# Patient Record
Sex: FEMALE | Race: BLACK OR AFRICAN AMERICAN | Employment: PART TIME | URBAN - METROPOLITAN AREA
[De-identification: names, ages, dates, MRNs, and addresses within clinical notes are randomized per-mention and may not be internally consistent; named-entity substitution may affect disease eponyms.]

---

## 2019-12-19 LAB
ANTIBODY SCREEN, EXTERNAL: NEGATIVE
CHLAMYDIA, EXTERNAL: NEGATIVE
HBSAG, EXTERNAL: NEGATIVE
HIV, EXTERNAL: NORMAL
N. GONORRHEA, EXTERNAL: NEGATIVE
RPR, EXTERNAL: NORMAL
RUBELLA, EXTERNAL: NORMAL
TYPE, ABO & RH, EXTERNAL: NORMAL

## 2020-04-11 LAB — GTT, 1 HR, GLUCOLA, EXTERNAL: 63

## 2020-06-29 LAB — GRBS, EXTERNAL: NEGATIVE

## 2020-07-15 ENCOUNTER — HOSPITAL ENCOUNTER (INPATIENT)
Age: 30
LOS: 2 days | Discharge: HOME | End: 2020-07-17
Attending: SPECIALIST | Admitting: SPECIALIST
Payer: COMMERCIAL

## 2020-07-15 ENCOUNTER — ANESTHESIA (OUTPATIENT)
Dept: LABOR AND DELIVERY | Age: 30
End: 2020-07-15
Payer: COMMERCIAL

## 2020-07-15 ENCOUNTER — ANESTHESIA EVENT (OUTPATIENT)
Dept: LABOR AND DELIVERY | Age: 30
End: 2020-07-15
Payer: COMMERCIAL

## 2020-07-15 DIAGNOSIS — Z98.891 S/P C-SECTION: Primary | ICD-10-CM

## 2020-07-15 LAB
BASOPHILS # BLD: 0 K/UL (ref 0–0.1)
BASOPHILS NFR BLD: 0 % (ref 0–1)
COVID-19 RAPID TEST, COVR: NOT DETECTED
DIFFERENTIAL METHOD BLD: ABNORMAL
EOSINOPHIL # BLD: 0.1 K/UL (ref 0–0.4)
EOSINOPHIL NFR BLD: 1 % (ref 0–7)
ERYTHROCYTE [DISTWIDTH] IN BLOOD BY AUTOMATED COUNT: 14.6 % (ref 11.5–14.5)
HCT VFR BLD AUTO: 31.8 % (ref 35–47)
HGB BLD-MCNC: 10.1 G/DL (ref 11.5–16)
IMM GRANULOCYTES # BLD AUTO: 0 K/UL (ref 0–0.04)
IMM GRANULOCYTES NFR BLD AUTO: 0 % (ref 0–0.5)
LYMPHOCYTES # BLD: 1.7 K/UL (ref 0.8–3.5)
LYMPHOCYTES NFR BLD: 23 % (ref 12–49)
MCH RBC QN AUTO: 26.9 PG (ref 26–34)
MCHC RBC AUTO-ENTMCNC: 31.8 G/DL (ref 30–36.5)
MCV RBC AUTO: 84.8 FL (ref 80–99)
MONOCYTES # BLD: 0.7 K/UL (ref 0–1)
MONOCYTES NFR BLD: 9 % (ref 5–13)
NEUTS SEG # BLD: 5 K/UL (ref 1.8–8)
NEUTS SEG NFR BLD: 67 % (ref 32–75)
NRBC # BLD: 0 K/UL (ref 0–0.01)
NRBC BLD-RTO: 0 PER 100 WBC
PLATELET # BLD AUTO: 259 K/UL (ref 150–400)
PMV BLD AUTO: 10.7 FL (ref 8.9–12.9)
RBC # BLD AUTO: 3.75 M/UL (ref 3.8–5.2)
SARS-COV-2, COV2: NOT DETECTED
SOURCE, COVRS: NORMAL
SPECIMEN SOURCE, FCOV2M: NORMAL
SPECIMEN SOURCE, FCOV2M: NORMAL
WBC # BLD AUTO: 7.4 K/UL (ref 3.6–11)

## 2020-07-15 PROCEDURE — 65270000029 HC RM PRIVATE

## 2020-07-15 PROCEDURE — 74011000250 HC RX REV CODE- 250: Performed by: ANESTHESIOLOGY

## 2020-07-15 PROCEDURE — 77030036554

## 2020-07-15 PROCEDURE — 36415 COLL VENOUS BLD VENIPUNCTURE: CPT

## 2020-07-15 PROCEDURE — 76060000078 HC EPIDURAL ANESTHESIA: Performed by: SPECIALIST

## 2020-07-15 PROCEDURE — 77030033542 HC RETRCTR RETNTS PANICLS GQSM -B

## 2020-07-15 PROCEDURE — 75410000003 HC RECOV DEL/VAG/CSECN EA 0.5 HR: Performed by: SPECIALIST

## 2020-07-15 PROCEDURE — 77030007866 HC KT SPN ANES BBMI -B: Performed by: ANESTHESIOLOGY

## 2020-07-15 PROCEDURE — 77030040179 HC DEV DRSG WND PICO S&N -C

## 2020-07-15 PROCEDURE — 85025 COMPLETE CBC W/AUTO DIFF WBC: CPT

## 2020-07-15 PROCEDURE — 87635 SARS-COV-2 COVID-19 AMP PRB: CPT

## 2020-07-15 PROCEDURE — 77030008459 HC STPLR SKN COOP -B

## 2020-07-15 PROCEDURE — 86900 BLOOD TYPING SEROLOGIC ABO: CPT

## 2020-07-15 PROCEDURE — 76010000391 HC C SECN FIRST 1 HR: Performed by: SPECIALIST

## 2020-07-15 PROCEDURE — 77030040361 HC SLV COMPR DVT MDII -B

## 2020-07-15 PROCEDURE — 74011250636 HC RX REV CODE- 250/636: Performed by: ANESTHESIOLOGY

## 2020-07-15 PROCEDURE — 74011250636 HC RX REV CODE- 250/636: Performed by: SPECIALIST

## 2020-07-15 PROCEDURE — 74011000258 HC RX REV CODE- 258: Performed by: ANESTHESIOLOGY

## 2020-07-15 PROCEDURE — 74011000250 HC RX REV CODE- 250: Performed by: SPECIALIST

## 2020-07-15 PROCEDURE — 77030018836 HC SOL IRR NACL ICUM -A

## 2020-07-15 RX ORDER — SODIUM CHLORIDE 0.9 % (FLUSH) 0.9 %
5-40 SYRINGE (ML) INJECTION AS NEEDED
Status: DISCONTINUED | OUTPATIENT
Start: 2020-07-15 | End: 2020-07-17 | Stop reason: HOSPADM

## 2020-07-15 RX ORDER — BUPIVACAINE HYDROCHLORIDE 7.5 MG/ML
INJECTION, SOLUTION EPIDURAL; RETROBULBAR AS NEEDED
Status: DISCONTINUED | OUTPATIENT
Start: 2020-07-15 | End: 2020-07-15 | Stop reason: HOSPADM

## 2020-07-15 RX ORDER — FAMOTIDINE 10 MG/ML
INJECTION INTRAVENOUS AS NEEDED
Status: DISCONTINUED | OUTPATIENT
Start: 2020-07-15 | End: 2020-07-15 | Stop reason: HOSPADM

## 2020-07-15 RX ORDER — SODIUM CHLORIDE, SODIUM LACTATE, POTASSIUM CHLORIDE, CALCIUM CHLORIDE 600; 310; 30; 20 MG/100ML; MG/100ML; MG/100ML; MG/100ML
150 INJECTION, SOLUTION INTRAVENOUS CONTINUOUS
Status: DISCONTINUED | OUTPATIENT
Start: 2020-07-15 | End: 2020-07-17 | Stop reason: HOSPADM

## 2020-07-15 RX ORDER — NALOXONE HYDROCHLORIDE 0.4 MG/ML
0.4 INJECTION, SOLUTION INTRAMUSCULAR; INTRAVENOUS; SUBCUTANEOUS AS NEEDED
Status: DISCONTINUED | OUTPATIENT
Start: 2020-07-15 | End: 2020-07-17 | Stop reason: HOSPADM

## 2020-07-15 RX ORDER — IBUPROFEN 800 MG/1
800 TABLET ORAL EVERY 8 HOURS
Status: DISCONTINUED | OUTPATIENT
Start: 2020-07-15 | End: 2020-07-17 | Stop reason: HOSPADM

## 2020-07-15 RX ORDER — SODIUM CHLORIDE 0.9 % (FLUSH) 0.9 %
5-40 SYRINGE (ML) INJECTION EVERY 8 HOURS
Status: DISCONTINUED | OUTPATIENT
Start: 2020-07-15 | End: 2020-07-15 | Stop reason: HOSPADM

## 2020-07-15 RX ORDER — FENTANYL CITRATE 50 UG/ML
INJECTION, SOLUTION INTRAMUSCULAR; INTRAVENOUS AS NEEDED
Status: DISCONTINUED | OUTPATIENT
Start: 2020-07-15 | End: 2020-07-15 | Stop reason: HOSPADM

## 2020-07-15 RX ORDER — SODIUM CHLORIDE, SODIUM LACTATE, POTASSIUM CHLORIDE, CALCIUM CHLORIDE 600; 310; 30; 20 MG/100ML; MG/100ML; MG/100ML; MG/100ML
1000 INJECTION, SOLUTION INTRAVENOUS CONTINUOUS
Status: DISCONTINUED | OUTPATIENT
Start: 2020-07-15 | End: 2020-07-15 | Stop reason: HOSPADM

## 2020-07-15 RX ORDER — ONDANSETRON 2 MG/ML
INJECTION INTRAMUSCULAR; INTRAVENOUS AS NEEDED
Status: DISCONTINUED | OUTPATIENT
Start: 2020-07-15 | End: 2020-07-15 | Stop reason: HOSPADM

## 2020-07-15 RX ORDER — KETOROLAC TROMETHAMINE 30 MG/ML
30 INJECTION, SOLUTION INTRAMUSCULAR; INTRAVENOUS
Status: ACTIVE | OUTPATIENT
Start: 2020-07-15 | End: 2020-07-16

## 2020-07-15 RX ORDER — SIMETHICONE 80 MG
80 TABLET,CHEWABLE ORAL AS NEEDED
Status: DISCONTINUED | OUTPATIENT
Start: 2020-07-15 | End: 2020-07-17 | Stop reason: HOSPADM

## 2020-07-15 RX ORDER — HYDROCHLOROTHIAZIDE 25 MG/1
25 TABLET ORAL 2 TIMES DAILY
Status: DISCONTINUED | OUTPATIENT
Start: 2020-07-15 | End: 2020-07-17 | Stop reason: HOSPADM

## 2020-07-15 RX ORDER — NALOXONE HYDROCHLORIDE 0.4 MG/ML
0.1 INJECTION, SOLUTION INTRAMUSCULAR; INTRAVENOUS; SUBCUTANEOUS
Status: DISCONTINUED | OUTPATIENT
Start: 2020-07-15 | End: 2020-07-17 | Stop reason: HOSPADM

## 2020-07-15 RX ORDER — MORPHINE SULFATE 0.5 MG/ML
INJECTION, SOLUTION EPIDURAL; INTRATHECAL; INTRAVENOUS AS NEEDED
Status: DISCONTINUED | OUTPATIENT
Start: 2020-07-15 | End: 2020-07-15 | Stop reason: HOSPADM

## 2020-07-15 RX ORDER — OXYCODONE HYDROCHLORIDE 5 MG/1
10 TABLET ORAL
Status: ACTIVE | OUTPATIENT
Start: 2020-07-15 | End: 2020-07-16

## 2020-07-15 RX ORDER — ONDANSETRON 2 MG/ML
4 INJECTION INTRAMUSCULAR; INTRAVENOUS
Status: DISCONTINUED | OUTPATIENT
Start: 2020-07-15 | End: 2020-07-17 | Stop reason: HOSPADM

## 2020-07-15 RX ORDER — ACETAMINOPHEN 325 MG/1
650 TABLET ORAL
Status: DISCONTINUED | OUTPATIENT
Start: 2020-07-15 | End: 2020-07-17 | Stop reason: HOSPADM

## 2020-07-15 RX ORDER — DIPHENHYDRAMINE HYDROCHLORIDE 50 MG/ML
12.5 INJECTION, SOLUTION INTRAMUSCULAR; INTRAVENOUS
Status: DISCONTINUED | OUTPATIENT
Start: 2020-07-15 | End: 2020-07-15 | Stop reason: SDUPTHER

## 2020-07-15 RX ORDER — OXYCODONE HYDROCHLORIDE 5 MG/1
5 TABLET ORAL
Status: DISCONTINUED | OUTPATIENT
Start: 2020-07-15 | End: 2020-07-15

## 2020-07-15 RX ORDER — SODIUM CHLORIDE 0.9 % (FLUSH) 0.9 %
5-40 SYRINGE (ML) INJECTION AS NEEDED
Status: DISCONTINUED | OUTPATIENT
Start: 2020-07-15 | End: 2020-07-15 | Stop reason: HOSPADM

## 2020-07-15 RX ORDER — OXYTOCIN 10 [USP'U]/ML
INJECTION, SOLUTION INTRAMUSCULAR; INTRAVENOUS AS NEEDED
Status: DISCONTINUED | OUTPATIENT
Start: 2020-07-15 | End: 2020-07-15 | Stop reason: HOSPADM

## 2020-07-15 RX ORDER — SODIUM CHLORIDE 0.9 % (FLUSH) 0.9 %
5-40 SYRINGE (ML) INJECTION EVERY 8 HOURS
Status: DISCONTINUED | OUTPATIENT
Start: 2020-07-15 | End: 2020-07-16

## 2020-07-15 RX ORDER — DIPHENHYDRAMINE HYDROCHLORIDE 50 MG/ML
12.5 INJECTION, SOLUTION INTRAMUSCULAR; INTRAVENOUS
Status: DISCONTINUED | OUTPATIENT
Start: 2020-07-15 | End: 2020-07-17 | Stop reason: HOSPADM

## 2020-07-15 RX ORDER — MISOPROSTOL 100 UG/1
TABLET ORAL AS NEEDED
Status: DISCONTINUED | OUTPATIENT
Start: 2020-07-15 | End: 2020-07-15 | Stop reason: HOSPADM

## 2020-07-15 RX ORDER — OXYCODONE AND ACETAMINOPHEN 5; 325 MG/1; MG/1
1 TABLET ORAL
Status: DISCONTINUED | OUTPATIENT
Start: 2020-07-15 | End: 2020-07-17 | Stop reason: HOSPADM

## 2020-07-15 RX ORDER — EPHEDRINE SULFATE/0.9% NACL/PF 50 MG/5 ML
SYRINGE (ML) INTRAVENOUS AS NEEDED
Status: DISCONTINUED | OUTPATIENT
Start: 2020-07-15 | End: 2020-07-15 | Stop reason: HOSPADM

## 2020-07-15 RX ORDER — ZOLPIDEM TARTRATE 5 MG/1
5 TABLET ORAL
Status: DISCONTINUED | OUTPATIENT
Start: 2020-07-15 | End: 2020-07-17 | Stop reason: HOSPADM

## 2020-07-15 RX ADMIN — PHENYLEPHRINE HYDROCHLORIDE 50 MCG: 10 INJECTION INTRAVENOUS at 13:54

## 2020-07-15 RX ADMIN — FAMOTIDINE 20 MG: 10 INJECTION, SOLUTION INTRAVENOUS at 13:46

## 2020-07-15 RX ADMIN — SODIUM CHLORIDE, SODIUM LACTATE, POTASSIUM CHLORIDE, AND CALCIUM CHLORIDE 1000 ML: 600; 310; 30; 20 INJECTION, SOLUTION INTRAVENOUS at 12:22

## 2020-07-15 RX ADMIN — OXYTOCIN 40 UNITS: 10 INJECTION, SOLUTION INTRAMUSCULAR; INTRAVENOUS at 14:07

## 2020-07-15 RX ADMIN — WATER 2 G: 1 INJECTION INTRAMUSCULAR; INTRAVENOUS; SUBCUTANEOUS at 13:41

## 2020-07-15 RX ADMIN — FENTANYL CITRATE 10 MCG: 0.05 INJECTION, SOLUTION INTRAMUSCULAR; INTRAVENOUS at 13:49

## 2020-07-15 RX ADMIN — PHENYLEPHRINE HYDROCHLORIDE 50 MCG: 10 INJECTION INTRAVENOUS at 13:56

## 2020-07-15 RX ADMIN — ONDANSETRON HYDROCHLORIDE 4 MG: 2 SOLUTION INTRAMUSCULAR; INTRAVENOUS at 13:44

## 2020-07-15 RX ADMIN — SODIUM CHLORIDE, SODIUM LACTATE, POTASSIUM CHLORIDE, AND CALCIUM CHLORIDE 150 ML/HR: 600; 310; 30; 20 INJECTION, SOLUTION INTRAVENOUS at 23:25

## 2020-07-15 RX ADMIN — MORPHINE SULFATE 200 MCG: 0.5 INJECTION, SOLUTION EPIDURAL; INTRATHECAL; INTRAVENOUS at 13:49

## 2020-07-15 RX ADMIN — Medication 10 MG: at 14:03

## 2020-07-15 RX ADMIN — SODIUM CHLORIDE, SODIUM LACTATE, POTASSIUM CHLORIDE, AND CALCIUM CHLORIDE: 600; 310; 30; 20 INJECTION, SOLUTION INTRAVENOUS at 14:02

## 2020-07-15 RX ADMIN — BUPIVACAINE HYDROCHLORIDE 1.6 ML: 7.5 INJECTION, SOLUTION EPIDURAL; RETROBULBAR at 13:49

## 2020-07-15 RX ADMIN — ONDANSETRON 4 MG: 2 INJECTION INTRAMUSCULAR; INTRAVENOUS at 18:43

## 2020-07-15 RX ADMIN — SODIUM CHLORIDE, SODIUM LACTATE, POTASSIUM CHLORIDE, AND CALCIUM CHLORIDE 1000 ML: 600; 310; 30; 20 INJECTION, SOLUTION INTRAVENOUS at 11:00

## 2020-07-15 RX ADMIN — Medication 10 MG: at 13:51

## 2020-07-15 RX ADMIN — Medication 10 MG: at 13:58

## 2020-07-15 NOTE — LACTATION NOTE
This note was copied from a baby's chart. Discussed with mother her plan for feeding. Reviewed the benefits of exclusive breast milk feeding during the hospital stay. Informed her of the risks of using formula to supplement in the first few days of life as well as the benefits of successful breast milk feeding; referred her to the Breastfeeding booklet about this information. She acknowledges understanding of information reviewed and states that it is her plan to breastfeed her infant. Will support her choice and offer additional information as needed. Reviewed breastfeeding basics:  How milk is made and normal  breastfeeding behaviors discussed. Supply and demand,  stomach size, early feeding cues, skin to skin bonding with comfortable positioning and baby led latch-on reviewed. How to identify signs of successful breastfeeding sessions reviewed; education on assymetrical latch, signs of effective latching vs shallow, in-effective latching, normal  feeding frequency and duration and expected infant output discussed. Normal course of breastfeeding discussed including the AAP's recommendation that children receive exclusive breast milk feedings for the first six months of life with breast milk feedings to continue through the first year of life and/or beyond as complimentary table foods are added. Breastfeeding Booklet and Warm line information provided with discussion. Discussed typical  weight loss and the importance of pediatrician appointment within 24-48 hours of discharge, at 2 weeks of life and normalcy of requesting pediatric weight checks as needed in between visits. Pt will successfully establish breastfeeding by feeding in response to early feeding cues   or wake every 3h, will obtain deep latch, and will keep log of feedings/output. Taught to BF at hunger cues and or q 2-3 hrs and to offer 10-20 drops of hand expressed colostrum at any non-feeds.       Breast Assessment  Left Breast: Large  Left Nipple: Everted, Intact  Right Breast: Large  Right Nipple: Everted, Intact  Breast- Feeding Assessment  Attends Breast-Feeding Classes: No  Breast-Feeding Experience: Yes  Type/Quality: Good  Lactation Consultant Visits  Breast-Feedings: Good   Mother/Infant Observation  Mother Observation: Alignment, Breast comfortable, Holds breast, Lets baby end feeding  Infant Observation: Lips flanged, lower, Latches nipple and aereolae, Rhythmic suck, Relaxed after feeding, Opens mouth  LATCH Documentation  Latch: Repeated attempts, hold nipple in mouth, stimulate to suck  Audible Swallowing: A few with stimulation  Type of Nipple: Everted (after stimulation)  Comfort (Breast/Nipple): Soft/non-tender  Hold (Positioning): Full assist, teach one side, mother does other, staff holds  [facility] Score: 7

## 2020-07-15 NOTE — ANESTHESIA POSTPROCEDURE EVALUATION
Procedure(s):   SECTION. spinal    Anesthesia Post Evaluation      Multimodal analgesia: multimodal analgesia used between 6 hours prior to anesthesia start to PACU discharge  Patient location during evaluation: PACU  Patient participation: complete - patient participated  Level of consciousness: awake and alert  Airway patency: patent  Anesthetic complications: no  Cardiovascular status: acceptable  Respiratory status: acceptable  Hydration status: acceptable        INITIAL Post-op Vital signs:   Vitals Value Taken Time   /56 7/15/2020  2:40 PM   Temp     Pulse 78 7/15/2020  2:40 PM   Resp 17 7/15/2020  2:40 PM   SpO2 100 % 7/15/2020  2:48 PM   Vitals shown include unvalidated device data.

## 2020-07-15 NOTE — PROGRESS NOTES
7/15/2020  12:43 PM    CM met with REYNA to complete initial assessment and complete discharge planning. MOB verified and confirmed demographics. MOB lives with spouse/FOB- Filiberto Alicia along with their 6yr old, at the address on file. MOB is employed with [employer], and plans to take 12wks off from work. FOB is also employed and will be taking adequate time off. REYNA reports she has good family support. REYNA plans to breast feed baby and has pump to use at home. MOB plans to follow with [facility]. MOB has car seat, bassinet/crib, clothing, bottles and all necessary supplies for baby. MOB has [employer], but noted that baby would be added to [employer]. CM discussed process to add baby to insurance, MOB verbalized understanding. MOB denied [address] services. Care Management Interventions  PCP Verified by CM: Yes(Dr. Luis A Clark)  Mode of Transport at Discharge:  Other (see comment)  Transition of Care Consult (CM Consult): Discharge Planning  Current Support Network: Lives with Spouse, Family Lives [place], Own Home  Confirm Follow Up Transport: Family  Discharge Location  Discharge Placement: Home with family assistance  Danna Curtis

## 2020-07-15 NOTE — PROGRESS NOTES
Covid PCR still pending, RN called lab to check status (rapid was negative), results should be back in the morning per .

## 2020-07-15 NOTE — PROGRESS NOTES
Bedside and Verbal shift change report given to JOLIE Obrien RN (oncoming nurse) by Oni Feliciano RN (offgoing nurse). Report included the following information SBAR, Kardex, Intake/Output, MAR and Recent Results.

## 2020-07-15 NOTE — ANESTHESIA PREPROCEDURE EVALUATION
Anesthetic History   No history of anesthetic complications            Review of Systems / Medical History  Patient summary reviewed, nursing notes reviewed and pertinent labs reviewed    Pulmonary  Within defined limits                 Neuro/Psych   Within defined limits           Cardiovascular    Hypertension              Exercise tolerance: >4 METS     GI/Hepatic/Renal  Within defined limits              Endo/Other  Within defined limits           Other Findings              Physical Exam    Airway  Mallampati: II  TM Distance: 4 - 6 cm  Neck ROM: normal range of motion   Mouth opening: Normal     Cardiovascular               Dental  No notable dental hx       Pulmonary                 Abdominal         Other Findings            Anesthetic Plan    ASA: 2  Anesthesia type: spinal            Anesthetic plan and risks discussed with: Patient

## 2020-07-15 NOTE — H&P
History & Physical    Name: Kary Cuenca MRN: [id number]  SSN: [id number]    YOB: 1990  Age: 26 y.o. Sex: female      Subjective:     Estimated Date of Delivery: None noted. OB History    Para Term  AB Living   2 1 1 0 0 1   SAB TAB Ectopic Molar Multiple Live Births   0 0 0 0   1      # Outcome Date GA Lbr Jb/2nd Weight Sex Delivery Anes PTL Lv   2 Current            1 Term 14 39w3d  2.92 kg F  EPIDURAL AN, SPINAL AN N JING       Ms. Mike Chase admitted with pregnancy at Unknown for  section due to previous  section. Prenatal course was normal. Please see prenatal records for details. Past Medical History:   Diagnosis Date    ADHD     was on Adderall    Essential hypertension     in childhood, no meds ever needed     Past Surgical History:   Procedure Laterality Date    HX  SECTION      HX COLPOSCOPY  2018    normal     Social History     Occupational History    Occupation: OT    Tobacco Use    Smoking status: Never Smoker    Smokeless tobacco: Never Used   Substance and Sexual Activity    Alcohol use:  Yes     Alcohol/week: 2.0 standard drinks     Types: 2 Glasses of wine per week    Drug use: Yes     Types: Marijuana     Comment: very rare    Sexual activity: Yes     Partners: Male     Birth control/protection: Condom     Family History   Problem Relation Age of Onset    Hypertension Mother     High Cholesterol Mother     Hypertension Father     High Cholesterol Father     Prostate Cancer Father     No Known Problems Brother     Dementia Maternal Grandmother     Hypertension Maternal Grandmother     Hypertension Paternal Grandmother     Hypertension Paternal Grandfather     Diabetes Paternal Uncle     Schizophrenia Paternal Uncle     Diabetes Paternal Aunt        Allergies   Allergen Reactions    Bactrim [Sulfamethoprim Ds] Hives    Bactrim [Sulfamethoprim] Hives     Prior to Admission medications    Medication Sig Start Date End Date Taking? Authorizing Provider   dextroamphetamine-amphetamine (ADDERALL) 10 mg tablet Take 1 Tab by mouth two (2) times a day. Max Daily Amount: 20 mg. 20   Naty Pedroza PA   ibuprofen (MOTRIN) 800 mg tablet Take 1 Tab by mouth every eight (8) hours as needed for Pain. 14   Harry Samano MD        Review of Systems: A comprehensive review of systems was negative except for that written in the History of Present Illness. Objective:     Vitals:  Vitals:    07/15/20 1111   Height: 5' (1.524 m)        Physical Exam:  Deferred  Membranes:  Intact  Fetal Heart Rate: Reactive    Prenatal Labs:   Lab Results   Component Value Date/Time    Rubella, External Immune 2013    GrBStrep, External Pos.  04/15/2014    HBsAg, External Neg 2013    HIV, External NR 2013    RPR, External NR 2013    Gonorrhea, External neg 2013    Chlamydia, External neg 2013    ABO,Rh O Pos.  2013         Impression/Plan:     Plan:  Admit for  section. Group B Strep was see prenatal. Discussed the risks of surgery including the risks of bleeding, infection, deep vein thrombosis, and surgical injuries to internal organs including but not limited to the bowels, bladder, rectum, and female reproductive organs. The patient understands the risks; any and all questions were answered to the patient's satisfaction.

## 2020-07-15 NOTE — PROGRESS NOTES
1032 Patient that is a  arrived for scheduled repeat . 1043 EFM connected by this RN.     1100 Consents signed at this time. 1115 Patient states she is feeling light headed, RN assisted patient left lateral.     1135 Dr. Pastor Feliz called to ESTELA slo RN. MD states that he is running behind and will be here around 1300.     1147 Lab called this RN to update that patients rapid covid test is negative. 1230 ESTELA sol RN called this RN to updated that Dr. Pastor Feliz states that he will arrive between [id number].    1245 Dr. Brody Cabrera anesthesiologist bedside consenting the patient to surgery. 80 Dr. Pastor Feliz bedside consenting patient to surgery. 1344 Patient ambulatory to OR 2.     1347 Time out performed by this RN before placement of spinal in OR.     1353 Fetal Heart tones in OR noted to be 115.    1402 Time out performed by this RN in the OR before incision. Incision made by MD at this time. 1406 Vigorously crying female infant born via repeat LTCS. 1417 Incision closed and MD out of the OR.     1430 Recovery period initiated. 1436 Patient transferred from OR 2 back to room 204.     1500 Delivery QBL of 660ml. Patient sitting up tolerating ice chips at this time. 1600 Bedside and Verbal shift change report given to [facility] (oncoming nurse) by Robert Norris RN (offgoing nurse). Report included the following information Kardex, OR Summary, Intake/Output, MAR and Recent Results.

## 2020-07-15 NOTE — ANESTHESIA PROCEDURE NOTES
Spinal Block    Start time: 7/15/2020 1:48 PM  End time: 7/15/2020 1:49 PM  Performed by: Norma Dia MD  Authorized by: Norma Dia MD     Pre-procedure:   Indications: at surgeon's request and primary anesthetic  Preanesthetic Checklist: patient identified, risks and benefits discussed, anesthesia consent and timeout performed      Spinal Block:   Patient Position:  Seated  Prep Region:  Lumbar  Prep: chlorhexidine      Location:  L3-4  Technique:  Single shot        Needle:   Needle Type:  Pencan  Needle Gauge:  25 G  Attempts:  1      Events: CSF confirmed, no blood with aspiration and no paresthesia        Assessment:  Insertion:  Uncomplicated  Patient tolerance:  Patient tolerated the procedure well with no immediate complications

## 2020-07-15 NOTE — PROGRESS NOTES
1550 Received report from Glory Fontanez RN Patient resting on the stretcher. 1710 Patient transferred to MIU. Pepito Thomason RN assumed care of patient.

## 2020-07-16 LAB
ABO + RH BLD: NORMAL
BASOPHILS # BLD: 0 K/UL (ref 0–0.1)
BASOPHILS NFR BLD: 0 % (ref 0–1)
BLOOD GROUP ANTIBODIES SERPL: NORMAL
DIFFERENTIAL METHOD BLD: ABNORMAL
EOSINOPHIL # BLD: 0 K/UL (ref 0–0.4)
EOSINOPHIL NFR BLD: 0 % (ref 0–7)
ERYTHROCYTE [DISTWIDTH] IN BLOOD BY AUTOMATED COUNT: 14.4 % (ref 11.5–14.5)
HCT VFR BLD AUTO: 29 % (ref 35–47)
HGB BLD-MCNC: 9.4 G/DL (ref 11.5–16)
IMM GRANULOCYTES # BLD AUTO: 0.1 K/UL (ref 0–0.04)
IMM GRANULOCYTES NFR BLD AUTO: 1 % (ref 0–0.5)
LYMPHOCYTES # BLD: 1.1 K/UL (ref 0.8–3.5)
LYMPHOCYTES NFR BLD: 9 % (ref 12–49)
MCH RBC QN AUTO: 27.6 PG (ref 26–34)
MCHC RBC AUTO-ENTMCNC: 32.4 G/DL (ref 30–36.5)
MCV RBC AUTO: 85 FL (ref 80–99)
MONOCYTES # BLD: 1.1 K/UL (ref 0–1)
MONOCYTES NFR BLD: 8 % (ref 5–13)
NEUTS SEG # BLD: 10.6 K/UL (ref 1.8–8)
NEUTS SEG NFR BLD: 82 % (ref 32–75)
NRBC # BLD: 0 K/UL (ref 0–0.01)
NRBC BLD-RTO: 0 PER 100 WBC
PLATELET # BLD AUTO: 222 K/UL (ref 150–400)
PMV BLD AUTO: 10.7 FL (ref 8.9–12.9)
RBC # BLD AUTO: 3.41 M/UL (ref 3.8–5.2)
SPECIMEN EXP DATE BLD: NORMAL
WBC # BLD AUTO: 12.8 K/UL (ref 3.6–11)

## 2020-07-16 PROCEDURE — 65270000029 HC RM PRIVATE

## 2020-07-16 PROCEDURE — 85025 COMPLETE CBC W/AUTO DIFF WBC: CPT

## 2020-07-16 PROCEDURE — 74011250636 HC RX REV CODE- 250/636: Performed by: SPECIALIST

## 2020-07-16 PROCEDURE — 36415 COLL VENOUS BLD VENIPUNCTURE: CPT

## 2020-07-16 PROCEDURE — 74011250637 HC RX REV CODE- 250/637: Performed by: SPECIALIST

## 2020-07-16 PROCEDURE — 77030018846 HC SOL IRR STRL H20 ICUM -A

## 2020-07-16 RX ADMIN — SODIUM CHLORIDE, SODIUM LACTATE, POTASSIUM CHLORIDE, AND CALCIUM CHLORIDE 150 ML/HR: 600; 310; 30; 20 INJECTION, SOLUTION INTRAVENOUS at 06:36

## 2020-07-16 RX ADMIN — SIMETHICONE 80 MG: 80 TABLET, CHEWABLE ORAL at 17:18

## 2020-07-16 RX ADMIN — IBUPROFEN 800 MG: 800 TABLET ORAL at 17:18

## 2020-07-16 RX ADMIN — HYDROCHLOROTHIAZIDE 25 MG: 25 TABLET ORAL at 17:18

## 2020-07-16 RX ADMIN — MUPIROCIN: 20 OINTMENT TOPICAL at 11:01

## 2020-07-16 NOTE — OP NOTES
[facility] 77  OPERATIVE REPORT    Name:  Darcie Snowden  MR#:  [id number]  :  1990  ACCOUNT #:  [de-identified]  DATE OF SERVICE:  07/15/2020    PREOPERATIVE DIAGNOSIS:  Term pregnancy, previous . POSTOPERATIVE DIAGNOSIS:  Term pregnancy, previous . PROCEDURE PERFORMED:  Repeat low transverse  section. SURGEON:  Estela Salinas MD    ASSISTANT:  Nursing. ANESTHESIA:  Spinal.    COMPLICATIONS:  None. SPECIMENS REMOVED:  Pathology, none. IMPLANTS:  None. ESTIMATED BLOOD LOSS:  500 mL. FINDINGS:  There was delivery of a viable female infant. The uterus, tubes, ovaries, and placenta all appeared grossly normal.    DESCRIPTION OF THE PROCEDURE:  The patient was taken to the operating room and placed on the operating room table. After adequate anesthesia was achieved, she was placed in the left lateral tilt procedure. A Cedillo catheter was placed. The abdomen was prepped and the patient was draped in the usual sterile fashion. A low transverse incision was made on the skin with a knife and carried down to the fascia with the knife. The fascia was nicked in the midline and the fascial incision was extended bilaterally using blunt dissection. The rectus muscles were  bluntly in the midline and the peritoneum was entered bluntly. A bladder flap was created and a low transverse incision was made in the uterus with Metzenbaum scissors. This incision was extended bilaterally using blunt dissection. The head was delivered with the aid of one blade of the Vincent forceps. The shoulders and the rest of the body were delivered without difficulty. The cord was clamped and cut and the infant was handed to a waiting personnel. The placenta was delivered manually with findings as above. The uterus was exteriorized and the uterine incision was closed using a running interlocking suture of 0-Vicryl.   The uterus was placed back into the pelvis. Hemostasis was assured. Attention was turned to the fascia which was closed with a running suture of #1 Vicryl subcutaneously. The area was irrigated and found to be hemostatic and the skin was closed using the Insorb stapler. A sterile dressing was applied. 800 mcg of Cytotec were placed intrauterine just prior to uterine closure. There was clear urine draining from the Cedillo at the end of the procedure. All sponge, needle, and instrument counts were correct x2 at the end of the procedure. The patient tolerated the procedure and was taken to the recovery area on Labor and Delivery in satisfactory condition.     Tisha Sheth MD      [id number]  D:  07/15/2020 15:29  T:  07/16/2020 2:16  JOB #:  [id number]

## 2020-07-16 NOTE — PROGRESS NOTES
Bedside and Verbal shift change report given to JOLIE Monaco RN (oncoming nurse) by Nicole Greco RN (offgoing nurse). Report included the following information SBAR, Kardex, Intake/Output, MAR and Recent Results.

## 2020-07-16 NOTE — ROUTINE PROCESS
Bedside and Verbal shift change report given to [facility] wise, RN (oncoming nurse) by Ravindra Hercules RN  (offgoing nurse). Report included the following information SBAR, Kardex, Intake/Output, MAR and Recent Results.

## 2020-07-16 NOTE — LACTATION NOTE
This note was copied from a baby's chart. Pt will successfully establish breastfeeding by feeding in response to early feeding cues   or wake every 3h, will obtain deep latch, and will keep log of feedings/output. Taught to BF at hunger cues and or q 2-3 hrs and to offer 10-20 drops of hand expressed colostrum at any non-feeds. Breast Assessment  Left Breast: Large  Left Nipple: Everted, Intact  Right Breast: Large  Right Nipple: Everted, Intact  Breast- Feeding Assessment  Attends Breast-Feeding Classes: No  Breast-Feeding Experience: Yes  Type/Quality: Good  Lactation Consultant Visits  Breast-Feedings: Good   Mother/Infant Observation  Mother Observation: Alignment, Lets baby end feeding, Nipple round on release, Breast comfortable, Recognizes feeding cues, Sleepy after feeding  Infant Observation: Lips flanged, lower, Latches nipple and aereolae, Frenulum checked, Rhythmic suck, Relaxed after feeding, Opens mouth, Lips flanged, upper  LATCH Documentation  Latch: Grasps breast, tongue down, lips flanged, rhythmic sucking  Audible Swallowing: Spontaneous and intermittent (24 hours old)  Type of Nipple: Everted (after stimulation)  Comfort (Breast/Nipple): Filling, red/small blisters/bruises, mild/mod discomfort  Hold (Positioning): No assist from staff, mother able to position/hold infant  LATCH Score: 9      Care for sore/tender nipples discussed:  ways to improve positioning and latch practiced and discussed, hand express colostrum after feedings and let air dry, light application of lanolin, hydrogel pads, seek comfortable laid back feeding position, start feedings on least sore side first.    Ordered Kurt payton's nipple cream.  Instructed Mom on how to apply and instructions for gel pads. Mom staes\" I do get sleepy when I breastfeed. \"

## 2020-07-16 NOTE — PROGRESS NOTES
1130: Patient states her pain level is 4/10 and that she is comfortable. This RN educated patient about pain management after a . Patient states she does not want to take any pain medication. This RN offered Motrin or Tylenol if patient does not want to take narcotics, patient refused.

## 2020-07-16 NOTE — PROGRESS NOTES
Post-Operative Day Number 1 Progress Note    Patient doing well post-op day 1 from  delivery without significant complaints. Pain controlled on current medication. Voiding without difficulty, normal lochia. Vitals:    Patient Vitals for the past 8 hrs:   BP Temp Pulse Resp   20 1109 125/60 98.1 °F (36.7 °C) 72 14   20 0640 110/62 98.4 °F (36.9 °C) 68 14     Temp (24hrs), Av °F (36.7 °C), Min:97.6 °F (36.4 °C), Max:98.5 °F (36.9 °C)      Vital signs stable, afebrile. Exam:  Patient without distress. Abdomen soft, fundus firm at level of umbilicus, non tender. Incision dry and clean without erythema. Lower extremities are negative for swelling, cords or tenderness. Lab/Data Review: All lab results for the last 24 hours reviewed. Assessment and Plan:  Patient appears to be having uncomplicated post- course. Continue routine post-op care and maternal education.

## 2020-07-16 NOTE — PROGRESS NOTES
RN educated patient on pain management/medication options. Patient is wishing to stay away from Narcotics, RN offered Tylenol and suggested Motrin instead of Toradol (which patient does not want at this time) once she is eating and keeping food down. Patient verbalized understanding, and at this time wishes to not take anything for her pain as it is tolerable.

## 2020-07-17 VITALS
SYSTOLIC BLOOD PRESSURE: 110 MMHG | WEIGHT: 171 LBS | RESPIRATION RATE: 14 BRPM | HEART RATE: 76 BPM | DIASTOLIC BLOOD PRESSURE: 70 MMHG | TEMPERATURE: 98.1 F | HEIGHT: 60 IN | BODY MASS INDEX: 33.57 KG/M2 | OXYGEN SATURATION: 100 %

## 2020-07-17 RX ORDER — IBUPROFEN 800 MG/1
800 TABLET ORAL
Qty: 30 TAB | Refills: 1 | Status: SHIPPED | OUTPATIENT
Start: 2020-07-17 | End: 2022-04-04

## 2020-07-17 NOTE — DISCHARGE SUMMARY
Obstetrical Discharge Summary     Name: Juan A Cowart MRN: [id number]  SSN: [id number]    YOB: 1990  Age: 26 y.o. Sex: female      Allergies: Bactrim [sulfamethoprim ds] and Bactrim [sulfamethoprim]    Admit Date: 7/15/2020    Discharge Date: 2020     Admitting Physician: Ton Figueroa MD     Attending Physician:  Mike Ponce MD     * Admission Diagnoses: Previous  delivery affecting pregnancy [O34.219]  Labor and delivery indication for care or intervention [O75.9]    * Discharge Diagnoses:   Information for the patient's :  David Carmona [[id number]]   Delivery of a 3.31 kg female infant via , Low Transverse on 7/15/2020 at 2:06 PM  by Ton Figueroa. Apgars were 9  and 9 . Additional Diagnoses:   Hospital Problems as of 2020 Date Reviewed: 2020          Codes Class Noted - Resolved POA    Labor and delivery indication for care or intervention ICD-10-CM: O75.9  ICD-9-CM: 659.90  7/15/2020 - Present Unknown             Lab Results   Component Value Date/Time    ABO/Rh(D) O POSITIVE 07/15/2020 11:11 AM    Rubella, External Immune 2019    GrBStrep, External Negative 2020    ABO,Rh O positive  2019    There is no immunization history for the selected administration types on file for this patient.     * Procedures: c/s  Procedure(s):   SECTION      [place]  Depression Scale  I have been able to laugh and see the funny side of things: As much as I always could  I have looked forward with enjoyment to things: As much as I ever did  I have blamed myself unnecessarily when things went wrong: Not very often  I have been anxious or worried for no good reason: Hardly ever  I have felt scared or panicky for no very good reason: No, not at all  Things have been getting on top of me: No, most of the time I have coped quite well  I have been so unhappy that I have had difficulty sleeping: No, not at all  I have felt sad or miserable: No, not at all  I have been so unhappy that I have been crying: No, never  The thought of harming myself has occurred to me: Never  Total Score: 3    * Discharge Condition: good    * Hospital Course: Normal hospital course following the delivery. * Disposition: Home    Discharge Medications:   Current Discharge Medication List      CONTINUE these medications which have CHANGED    Details   ibuprofen (MOTRIN) 800 mg tablet Take 1 Tab by mouth every eight (8) hours as needed for Pain. Qty: 30 Tab, Refills: 1    Associated Diagnoses: S/P          CONTINUE these medications which have NOT CHANGED    Details   dextroamphetamine-amphetamine (ADDERALL) 10 mg tablet Take 1 Tab by mouth two (2) times a day. Max Daily Amount: 20 mg.  Qty: 60 Tab, Refills: 0    Associated Diagnoses: Attention deficit hyperactivity disorder (ADHD), other type; Encounter for medication refill             * Follow-up Care/Patient Instructions:   Activity: Activity as tolerated, No driving for 2 weeks, No driving while on analgesics and No heavy lifting for 6 weeks  Diet: Regular Diet  Wound Care: Keep wound clean and dry    Follow-up Information     Follow up With Specialties Details Why Contact [facility]  Schedule an appointment as soon as possible for a visit in 4 weeks or , As needed, If symptoms worsen [phone]

## 2020-07-17 NOTE — LACTATION NOTE
This note was copied from a baby's chart. Mother and baby for discharge today. Mother states baby just finished breastfeeding prior to visit. Her breasts are full and milk is in.  LC reviewed the following:    Infant weight loss is -4.6% (WNL). Reviewed breastfeeding basics:  Supply and demand, breastfeed baby 8-12 times in 24 hr., feed on demand,   stomach size, early  Feeding cues, skin to skin, positioning and baby led latch-on, assymetrical latch with signs of good, deep latch vs shallow, feeding frequency and duration, and log sheet for tracking infant feedings and output. Breastfeeding Booklet and Warm line information given. Discussed typical  weight loss and the importance of infant weight checks with pediatrician 1-2 post discharge. Care for sore/tender nipples discussed:  ways to improve positioning and latch practiced and discussed, hand express colostrum after feedings and let air dry, light application of lanolin, hydrogel pads, seek comfortable laid back feeding position, start feedings on least sore side first.    Engorgement Care Guidelines:  Reviewed how milk is made and normal phases of milk production. Taught care of engorged breasts - frequent breastfeeding encouraged, cool packs and motrin as tolerated. Anticipatory guidance shared. Discussed eating a healthy diet. Instructed mother to eat a variety of foods in order to get a well balanced diet. She should consume an extra 500 calories per day (more than her non-pregnant requirement.) These extra calories will help provide energy needed for optimal breast milk production. Mother also encouraged to \"drink to thirst\" and it is recommended that she drink fluids such as water, fruit/vegetable juice. Nutritious snacks should be available so that she can eat throughout the day to help satisfy her hunger and maintain a good milk supply.     Pt will successfully establish breastfeeding by feeding in response to early feeding cues   or wake every 3h, will obtain deep latch, and will keep log of feedings/output. Taught to BF at hunger cues and or q 2-3 hrs and to offer 10-20 drops of hand expressed colostrum at any non-feeds. Breast Assessment  Left Breast: Large, Extra large(Breasts are full - milk came in today)  Left Nipple: Everted, Intact, Large  Right Breast: Large, Extra large  Right Nipple: Everted, Intact, Large  Breast- Feeding Assessment  Attends Breast-Feeding Classes: No  Breast-Feeding Experience: Yes(Pumped x 7 months with 1st baby)  Breast Trauma/Surgery: No  Type/Quality: Good(Per mother)  Lactation Consultant Visits  Breast-Feedings: (Mother and baby for early D/C. Mother's milk is in. Mother states baby is nursing well. She last nursed at 10:00 for 10 min. Instructed mom to call [facility] if baby feeds again before D/C.)    Chart shows numerous feedings, void, stool WNL. Discussed importance of monitoring outputs and feedings on first week of life. Discussed ways to tell if baby is  getting enough breast milk, ie  voids and stools, change in color of stool, and return to birth wt within 2 weeks. Follow up with pediatrician visit for weight check in 1-2 days (per AAP guidelines.)  Encouraged to call Warm Line  [phone]  for any questions/problems that arise.  Mother also given breastfeeding support group dates and times for any future needs

## 2020-07-17 NOTE — DISCHARGE INSTRUCTIONS
Patient Education         Section: What to Expect at Home  Your Recovery     A  section, or , is surgery to deliver your baby through a cut, called an incision, that the doctor makes in your lower belly and uterus. You may have some pain in your lower belly and need pain medicine for 1 to 2 weeks. You can expect some vaginal bleeding for several weeks. You will probably need about 6 weeks to fully recover. It is important to take it easy while the incision is healing. Avoid heavy lifting, strenuous activities, or exercises that strain the belly muscles while you are recovering. Ask a family member or friend for help with housework, cooking, and shopping. This care sheet gives you a general idea about how long it will take for you to recover. But each person recovers at a different pace. Follow the steps below to get better as quickly as possible. How can you care for yourself at home? Activity  · Rest when you feel tired. Getting enough sleep will help you recover. · Try to walk each day. Start by walking a little more than you did the day before. Bit by bit, increase the amount you walk. Walking boosts blood flow and helps prevent pneumonia, constipation, and blood clots. · Avoid strenuous activities, such as bicycle riding, jogging, weightlifting, and aerobic exercise, for 6 weeks or until your doctor says it is okay. · Until your doctor says it is okay, do not lift anything heavier than your baby. · Do not do sit-ups or other exercises that strain the belly muscles for 6 weeks or until your doctor says it is okay. · Hold a pillow over your incision when you cough or take deep breaths. This will support your belly and decrease your pain. · You may shower as usual. Pat the incision dry when you are done. · You will have some vaginal bleeding. Wear sanitary pads. Do not douche or use tampons until your doctor says it is okay. · Ask your doctor when you can drive again.   · You will probably need to take at least 6 weeks off work. It depends on the type of work you do and how you feel. · Ask your doctor when it is okay for you to have sex. Diet  · You can eat your normal diet. If your stomach is upset, try bland, low-fat foods like plain rice, broiled chicken, toast, and yogurt. · Drink plenty of fluids (unless your doctor tells you not to). · You may notice that your bowel movements are not regular right after your surgery. This is common. Try to avoid constipation and straining with bowel movements. You may want to take a fiber supplement every day. If you have not had a bowel movement after a couple of days, ask your doctor about taking a mild laxative. · If you are breastfeeding, limit alcohol. Alcohol can cause a lack of energy and other health problems for the baby when a breastfeeding woman drinks heavily. It can also get in the way of a mom's ability to feed her baby or to care for the child in other ways. There isn't a lot of research about exactly how much alcohol can harm a baby. Having no alcohol is the safest choice for your baby. If you choose to have a drink now and then, have only one drink, and limit the number of occasions that you have a drink. Wait to breastfeed at least 2 hours after you have a drink to reduce the amount of alcohol the baby may get in the milk. Medicines  · Your doctor will tell you if and when you can restart your medicines. He or she will also give you instructions about taking any new medicines. · If you take aspirin or some other blood thinner, ask your doctor if and when to start taking it again. Make sure that you understand exactly what your doctor wants you to do. · Take pain medicines exactly as directed. ? If the doctor gave you a prescription medicine for pain, take it as prescribed. ? If you are not taking a prescription pain medicine, ask your doctor if you can take an over-the-counter medicine.   · If you think your pain medicine is making you sick to your stomach:  ? Take your medicine after meals (unless your doctor has told you not to). ? Ask your doctor for a different pain medicine. · If your doctor prescribed antibiotics, take them as directed. Do not stop taking them just because you feel better. You need to take the full course of antibiotics. Incision care  · If you have strips of tape on the incision, leave the tape on for a week or until it falls off. · Wash the area daily with warm, soapy water, and pat it dry. Don't use hydrogen peroxide or alcohol, which can slow healing. You may cover the area with a gauze bandage if it weeps or rubs against clothing. Change the bandage every day. · Keep the area clean and dry. Other instructions  · If you breastfeed your baby, you may be more comfortable while you are healing if you place the baby so that he or she is not resting on your belly. Try tucking your baby under your arm, with his or her body along the side you will be feeding on. Support your baby's upper body with your arm. With that hand you can control your baby's head to bring his or her mouth to your breast. This is sometimes called the football hold. Follow-up care is a key part of your treatment and safety. Be sure to make and go to all appointments, and call your doctor if you are having problems. It's also a good idea to know your test results and keep a list of the medicines you take. When should you call for help? Call  911 anytime you think you may need emergency care. For example, call if:  · You have thoughts of harming yourself, your baby, or another person. · You passed out (lost consciousness). · You have chest pain, are short of breath, or cough up blood. · You have a seizure. Call your doctor now or seek immediate medical care if:  · You have pain that does not get better after you take pain medicine. · You have severe vaginal bleeding.   · You are dizzy or lightheaded, or you feel like you may faint.  · You have new or worse pain in your belly or pelvis. · You have loose stitches, or your incision comes open. · You have symptoms of infection, such as:  ? Increased pain, swelling, warmth, or redness. ? Red streaks leading from the incision. ? Pus draining from the incision. ? A fever. · You have symptoms of a blood clot in your leg (called a deep vein thrombosis), such as:  ? Pain in your calf, back of the knee, thigh, or groin. ? Redness and swelling in your leg or groin. · You have signs of preeclampsia, such as:  ? Sudden swelling of your face, hands, or feet. ? New vision problems (such as dimness, blurring, or seeing spots). ? A severe headache. Watch closely for changes in your health, and be sure to contact your doctor if:  · You do not get better as expected. Where can you learn more? Go to http://www.robledo.com/  Enter M806 in the search box to learn more about \" Section: What to Expect at Home. \"  Current as of: 2020               Content Version: 12.5  © 3520-5503 [employer]. Care instructions adapted under license by [employer] (which disclaims liability or warranty for this information). If you have questions about a medical condition or this instruction, always ask your healthcare professional. [address] any warranty or liability for your use of this information.

## 2020-07-17 NOTE — PROGRESS NOTES
0900 Patient asked if she could have TONI dressing removed before she is discharged home  Contacted Dr. Alfred Macias on call OB for group she stated her plan was to keep the dressing on until follow up in 1 week but if patient is insisting it comes off it can. Spoke with patient she states she really wants it off because she does not want to go home with it because the battery pack of the TONI keeps getting pulled. 7971 removed TONI , steri strips moist one strip is almost falling off assessed wound underneath small section of subcutaneous tissue sticking out , no active drainage at this time.  Had another RN come in to assess wound  1030 Wound nurse Olya Jacob on unit had her assess wound felt wound was intact   1045 new steri strip added patient getting in shower instructed patient to pat area dry post shower and place clean peripad at site to absorb moisture and assess for drainage  1115 assessed would post shower , no drainage

## 2020-07-17 NOTE — PROGRESS NOTES
Post-Operative Day Number 2 Progress Note    Patient doing well post-op day 2 from  delivery without significant complaints. Pain controlled on current medication. Voiding without difficulty, normal lochia. Vitals:  No data found. Temp (24hrs), Av.7 °F (37.1 °C), Min:98.1 °F (36.7 °C), Max:99.4 °F (37.4 °C)      Vital signs stable, afebrile. Exam:  Patient without distress. Abdomen soft, fundus firm at level of umbilicus, non tender. Incision dry and clean without erythema. Lower extremities are negative for swelling, cords or tenderness. Lab/Data Review:  BMP: No results found for: NA, K, CL, CO2, AGAP, GLU, BUN, CREA, GFRAA, GFRNA  CMP: No results found for: NA, K, CL, CO2, AGAP, GLU, BUN, CREA, GFRAA, GFRNA, CA, MG, PHOS, ALB, TBIL, TP, ALB, GLOB, AGRAT, ALT  CBC: No results found for: WBC, HGB, HGBEXT, HCT, HCTEXT, PLT, PLTEXT, HGBEXT, HCTEXT, PLTEXT    Assessment and Plan:  Patient appears to be having uncomplicated post- course. Continue routine post-op care and maternal education.   Pt desires early discharge

## 2022-04-04 NOTE — PERIOP NOTES
[facility]  Ambulatory Surgery Unit  Pre-operative Instructions    Surgery/Procedure Date  Tuesday April 5th            Tentative Arrival Time 11:00 am      1. On the day of your surgery/procedure, please report to the Ambulatory Surgery Unit Registration Desk and sign in at your designated time. The Ambulatory Surgery Unit is located in [facility] on the [facility] side of the [facility] across from the [address] [place] [address]. Please have all of your health insurance cards and a photo ID.    **TWO adults may accompany you the day of the procedure. We have limited seating available. If our waiting room is at capacity, your ride may be asked to remain in their vehicle. No one under 15 is allowed in the waiting room. Masks, fully covering the mouth and nose, are required in the waiting room. 2. You must have someone with you to drive you home, as you should not drive a car for 24 hours following anesthesia. Please make arrangements for a responsible adult friend or family member to stay with you for at least the first 24 hours after your surgery. 3. Do not have anything to eat or drink (including water, gum, mints, coffee, juice) after 11:59 PM  Monday April 4th. This may not apply to medications prescribed by your physician. (Please note below the special instructions with medications to take the morning of surgery, if applicable.)    4. We recommend you do not drink any alcoholic beverages for 24 hours before and after your surgery. 5. Contact your surgeons office for instructions on the following medications: non-steroidal anti-inflammatory drugs (i.e. Advil, Aleve), vitamins, and supplements. (Some surgeons will want you to stop these medications prior to surgery and others may allow you to take them)   **If you are currently taking Plavix, Coumadin, Aspirin and/or other blood-thinning agents, contact your surgeon for instructions. ** Your surgeon will partner with the physician prescribing these medications to determine if it is safe to stop or if you need to continue taking. Please do not stop taking these medications without instructions from your surgeon. 6. In an effort to help prevent surgical site infection, we ask that you shower with an anti-bacterial soap (i.e. Dial/Safeguard, or the soap provided to you at your preadmission testing appointment) the night prior to on the morning of surgery, using a fresh towel after each shower. (Please begin this process with fresh bed linens.) Do not apply any lotions, powders, or deodorants after the shower on the day of your procedure. If applicable, please do not shave the operative site for 48 hours prior to surgery. 7. Wear comfortable clothes. Wear glasses instead of contacts. Do not bring any jewelry or money (other than copays or fees as instructed). Do not wear make-up, particularly mascara, the morning of your surgery. Do not wear nail polish, particularly if you are having foot /hand surgery. Wear your hair loose or down, no ponytails, buns, olesya pins or clips. All body piercings must be removed. 8. You should understand that if you do not follow these instructions your surgery may be cancelled. If your physical condition changes (i.e. fever, cold or flu) please contact your surgeon as soon as possible. 9. It is important that you be on time. If a situation occurs where you may be late, or if you have any questions or problems, please call [phone].    10. Your surgery time may be subject to change. You will receive a phone call the day prior to surgery to confirm your arrival time. 11. Pediatric patients: please bring a change of clothes, diapers, bottle/sippy cup, pacifier, etc.      Special Instructions:     Take all medications and inhalers, as prescribed, on the morning of surgery with a sip of water EXCEPT: n/a      Insulin Dependent Diabetic patients: Take your diabetic medications as prescribed the day before surgery. Hold all diabetic medications the day of surgery. If you are scheduled to arrive for surgery after 8:00 AM, and your AM blood sugar is >200, please call Ambulatory Surgery. I understand a pre-operative phone call will be made to verify my surgery time. In the event that I am not available, I give permission for a message to be left on my answering service and/or with another person?       Yes    Reviewed instructions via telephone, patient verbalized understanding         ___________________      ___________________      ________________  (Signature of Patient)          (Witness)                   (Date and Time)

## 2022-04-05 ENCOUNTER — ANESTHESIA (OUTPATIENT)
Dept: SURGERY | Age: 32
End: 2022-04-05
Payer: COMMERCIAL

## 2022-04-05 ENCOUNTER — ANESTHESIA EVENT (OUTPATIENT)
Dept: SURGERY | Age: 32
End: 2022-04-05
Payer: COMMERCIAL

## 2022-04-05 ENCOUNTER — HOSPITAL ENCOUNTER (OUTPATIENT)
Age: 32
Setting detail: OUTPATIENT SURGERY
Discharge: HOME | End: 2022-04-05
Attending: SPECIALIST | Admitting: SPECIALIST
Payer: COMMERCIAL

## 2022-04-05 VITALS
RESPIRATION RATE: 23 BRPM | HEART RATE: 70 BPM | HEIGHT: 61 IN | SYSTOLIC BLOOD PRESSURE: 106 MMHG | OXYGEN SATURATION: 99 % | DIASTOLIC BLOOD PRESSURE: 66 MMHG | WEIGHT: 140 LBS | TEMPERATURE: 98.5 F | BODY MASS INDEX: 26.43 KG/M2

## 2022-04-05 DIAGNOSIS — R52 PAIN: Primary | ICD-10-CM

## 2022-04-05 PROCEDURE — 76030000000 HC AMB SURG OR TIME 0.5 TO 1: Performed by: SPECIALIST

## 2022-04-05 PROCEDURE — 74011250636 HC RX REV CODE- 250/636: Performed by: NURSE ANESTHETIST, CERTIFIED REGISTERED

## 2022-04-05 PROCEDURE — 2709999900 HC NON-CHARGEABLE SUPPLY: Performed by: SPECIALIST

## 2022-04-05 PROCEDURE — 74011000250 HC RX REV CODE- 250: Performed by: SPECIALIST

## 2022-04-05 PROCEDURE — 76210000046 HC AMBSU PH II REC FIRST 0.5 HR: Performed by: SPECIALIST

## 2022-04-05 PROCEDURE — 74011250636 HC RX REV CODE- 250/636: Performed by: ANESTHESIOLOGY

## 2022-04-05 PROCEDURE — 88305 TISSUE EXAM BY PATHOLOGIST: CPT

## 2022-04-05 PROCEDURE — 77030008578 HC TBNG UTER SUC BUSS -A: Performed by: SPECIALIST

## 2022-04-05 PROCEDURE — 74011250637 HC RX REV CODE- 250/637: Performed by: SPECIALIST

## 2022-04-05 PROCEDURE — 74011000250 HC RX REV CODE- 250: Performed by: NURSE ANESTHETIST, CERTIFIED REGISTERED

## 2022-04-05 PROCEDURE — 76060000061 HC AMB SURG ANES 0.5 TO 1 HR: Performed by: SPECIALIST

## 2022-04-05 PROCEDURE — 77030020164: Performed by: SPECIALIST

## 2022-04-05 PROCEDURE — 76210000040 HC AMBSU PH I REC FIRST 0.5 HR: Performed by: SPECIALIST

## 2022-04-05 PROCEDURE — 74011250637 HC RX REV CODE- 250/637: Performed by: ANESTHESIOLOGY

## 2022-04-05 RX ORDER — OXYCODONE AND ACETAMINOPHEN 5; 325 MG/1; MG/1
1 TABLET ORAL
Status: DISCONTINUED | OUTPATIENT
Start: 2022-04-05 | End: 2022-04-05 | Stop reason: HOSPADM

## 2022-04-05 RX ORDER — KETOROLAC TROMETHAMINE 30 MG/ML
INJECTION, SOLUTION INTRAMUSCULAR; INTRAVENOUS AS NEEDED
Status: DISCONTINUED | OUTPATIENT
Start: 2022-04-05 | End: 2022-04-05 | Stop reason: HOSPADM

## 2022-04-05 RX ORDER — SODIUM CHLORIDE, SODIUM LACTATE, POTASSIUM CHLORIDE, CALCIUM CHLORIDE 600; 310; 30; 20 MG/100ML; MG/100ML; MG/100ML; MG/100ML
INJECTION, SOLUTION INTRAVENOUS
Status: DISCONTINUED | OUTPATIENT
Start: 2022-04-05 | End: 2022-04-05 | Stop reason: HOSPADM

## 2022-04-05 RX ORDER — OXYCODONE AND ACETAMINOPHEN 5; 325 MG/1; MG/1
1 TABLET ORAL
Qty: 12 TABLET | Refills: 0 | Status: SHIPPED | OUTPATIENT
Start: 2022-04-05 | End: 2022-04-08

## 2022-04-05 RX ORDER — DOXYCYCLINE HYCLATE 100 MG
200 TABLET ORAL ONCE
Status: COMPLETED | OUTPATIENT
Start: 2022-04-05 | End: 2022-04-05

## 2022-04-05 RX ORDER — FENTANYL CITRATE 50 UG/ML
25 INJECTION, SOLUTION INTRAMUSCULAR; INTRAVENOUS
Status: DISCONTINUED | OUTPATIENT
Start: 2022-04-05 | End: 2022-04-05 | Stop reason: HOSPADM

## 2022-04-05 RX ORDER — LIDOCAINE HYDROCHLORIDE 10 MG/ML
0.1 INJECTION, SOLUTION EPIDURAL; INFILTRATION; INTRACAUDAL; PERINEURAL AS NEEDED
Status: DISCONTINUED | OUTPATIENT
Start: 2022-04-05 | End: 2022-04-05 | Stop reason: HOSPADM

## 2022-04-05 RX ORDER — LIDOCAINE HYDROCHLORIDE 10 MG/ML
10 INJECTION, SOLUTION EPIDURAL; INFILTRATION; INTRACAUDAL; PERINEURAL ONCE
Status: COMPLETED | OUTPATIENT
Start: 2022-04-05 | End: 2022-04-05

## 2022-04-05 RX ORDER — DIPHENHYDRAMINE HYDROCHLORIDE 50 MG/ML
12.5 INJECTION, SOLUTION INTRAMUSCULAR; INTRAVENOUS AS NEEDED
Status: DISCONTINUED | OUTPATIENT
Start: 2022-04-05 | End: 2022-04-05 | Stop reason: HOSPADM

## 2022-04-05 RX ORDER — FENTANYL CITRATE 50 UG/ML
INJECTION, SOLUTION INTRAMUSCULAR; INTRAVENOUS AS NEEDED
Status: DISCONTINUED | OUTPATIENT
Start: 2022-04-05 | End: 2022-04-05 | Stop reason: HOSPADM

## 2022-04-05 RX ORDER — SODIUM CHLORIDE, SODIUM LACTATE, POTASSIUM CHLORIDE, CALCIUM CHLORIDE 600; 310; 30; 20 MG/100ML; MG/100ML; MG/100ML; MG/100ML
25 INJECTION, SOLUTION INTRAVENOUS CONTINUOUS
Status: DISCONTINUED | OUTPATIENT
Start: 2022-04-05 | End: 2022-04-05 | Stop reason: HOSPADM

## 2022-04-05 RX ORDER — MIDAZOLAM HYDROCHLORIDE 1 MG/ML
INJECTION, SOLUTION INTRAMUSCULAR; INTRAVENOUS AS NEEDED
Status: DISCONTINUED | OUTPATIENT
Start: 2022-04-05 | End: 2022-04-05 | Stop reason: HOSPADM

## 2022-04-05 RX ORDER — PROPOFOL 10 MG/ML
INJECTION, EMULSION INTRAVENOUS
Status: DISCONTINUED | OUTPATIENT
Start: 2022-04-05 | End: 2022-04-05 | Stop reason: HOSPADM

## 2022-04-05 RX ORDER — IBUPROFEN 800 MG/1
800 TABLET ORAL
Qty: 10 TABLET | Refills: 0 | Status: SHIPPED | OUTPATIENT
Start: 2022-04-05

## 2022-04-05 RX ORDER — METHYLERGONOVINE MALEATE 0.2 MG/ML
0.2 INJECTION INTRAVENOUS ONCE
Status: DISCONTINUED | OUTPATIENT
Start: 2022-04-05 | End: 2022-04-05 | Stop reason: HOSPADM

## 2022-04-05 RX ORDER — SODIUM CHLORIDE 0.9 % (FLUSH) 0.9 %
5-40 SYRINGE (ML) INJECTION AS NEEDED
Status: DISCONTINUED | OUTPATIENT
Start: 2022-04-05 | End: 2022-04-05 | Stop reason: HOSPADM

## 2022-04-05 RX ORDER — DEXAMETHASONE SODIUM PHOSPHATE 4 MG/ML
INJECTION, SOLUTION INTRA-ARTICULAR; INTRALESIONAL; INTRAMUSCULAR; INTRAVENOUS; SOFT TISSUE AS NEEDED
Status: DISCONTINUED | OUTPATIENT
Start: 2022-04-05 | End: 2022-04-05 | Stop reason: HOSPADM

## 2022-04-05 RX ORDER — SODIUM CHLORIDE 0.9 % (FLUSH) 0.9 %
5-40 SYRINGE (ML) INJECTION EVERY 8 HOURS
Status: DISCONTINUED | OUTPATIENT
Start: 2022-04-05 | End: 2022-04-05 | Stop reason: HOSPADM

## 2022-04-05 RX ORDER — ONDANSETRON 2 MG/ML
INJECTION INTRAMUSCULAR; INTRAVENOUS AS NEEDED
Status: DISCONTINUED | OUTPATIENT
Start: 2022-04-05 | End: 2022-04-05 | Stop reason: HOSPADM

## 2022-04-05 RX ORDER — ACETAMINOPHEN 500 MG
1000 TABLET ORAL ONCE
Status: COMPLETED | OUTPATIENT
Start: 2022-04-05 | End: 2022-04-05

## 2022-04-05 RX ORDER — MORPHINE SULFATE 10 MG/ML
2 INJECTION, SOLUTION INTRAMUSCULAR; INTRAVENOUS
Status: DISCONTINUED | OUTPATIENT
Start: 2022-04-05 | End: 2022-04-05 | Stop reason: HOSPADM

## 2022-04-05 RX ORDER — ONDANSETRON 2 MG/ML
4 INJECTION INTRAMUSCULAR; INTRAVENOUS AS NEEDED
Status: DISCONTINUED | OUTPATIENT
Start: 2022-04-05 | End: 2022-04-05 | Stop reason: HOSPADM

## 2022-04-05 RX ORDER — LIDOCAINE HYDROCHLORIDE 20 MG/ML
INJECTION, SOLUTION EPIDURAL; INFILTRATION; INTRACAUDAL; PERINEURAL AS NEEDED
Status: DISCONTINUED | OUTPATIENT
Start: 2022-04-05 | End: 2022-04-05 | Stop reason: HOSPADM

## 2022-04-05 RX ORDER — HYDROMORPHONE HYDROCHLORIDE 1 MG/ML
.2-.5 INJECTION, SOLUTION INTRAMUSCULAR; INTRAVENOUS; SUBCUTANEOUS ONCE
Status: DISCONTINUED | OUTPATIENT
Start: 2022-04-05 | End: 2022-04-05 | Stop reason: HOSPADM

## 2022-04-05 RX ORDER — SILVER NITRATE 38.21; 12.74 MG/1; MG/1
1 STICK TOPICAL ONCE
Status: DISCONTINUED | OUTPATIENT
Start: 2022-04-05 | End: 2022-04-05 | Stop reason: HOSPADM

## 2022-04-05 RX ADMIN — FENTANYL CITRATE 10 MCG: 50 INJECTION, SOLUTION INTRAMUSCULAR; INTRAVENOUS at 12:07

## 2022-04-05 RX ADMIN — PROPOFOL INJECTABLE EMULSION 50 MCG/KG/MIN: 10 INJECTION, EMULSION INTRAVENOUS at 12:06

## 2022-04-05 RX ADMIN — DOXYCYCLINE HYCLATE 200 MG: 100 TABLET, COATED ORAL at 11:37

## 2022-04-05 RX ADMIN — LIDOCAINE HYDROCHLORIDE 20 MG: 20 INJECTION, SOLUTION EPIDURAL; INFILTRATION; INTRACAUDAL; PERINEURAL at 12:06

## 2022-04-05 RX ADMIN — FENTANYL CITRATE 10 MCG: 50 INJECTION, SOLUTION INTRAMUSCULAR; INTRAVENOUS at 12:11

## 2022-04-05 RX ADMIN — DEXAMETHASONE SODIUM PHOSPHATE 4 MG: 4 INJECTION, SOLUTION INTRAMUSCULAR; INTRAVENOUS at 12:07

## 2022-04-05 RX ADMIN — Medication 1000 MG: at 11:37

## 2022-04-05 RX ADMIN — KETOROLAC TROMETHAMINE 30 MG: 30 INJECTION, SOLUTION INTRAMUSCULAR; INTRAVENOUS at 12:28

## 2022-04-05 RX ADMIN — SODIUM CHLORIDE, POTASSIUM CHLORIDE, SODIUM LACTATE AND CALCIUM CHLORIDE: 600; 310; 30; 20 INJECTION, SOLUTION INTRAVENOUS at 12:03

## 2022-04-05 RX ADMIN — FENTANYL CITRATE 10 MCG: 50 INJECTION, SOLUTION INTRAMUSCULAR; INTRAVENOUS at 12:03

## 2022-04-05 RX ADMIN — ONDANSETRON HYDROCHLORIDE 4 MG: 2 INJECTION, SOLUTION INTRAMUSCULAR; INTRAVENOUS at 12:10

## 2022-04-05 RX ADMIN — SODIUM CHLORIDE, POTASSIUM CHLORIDE, SODIUM LACTATE AND CALCIUM CHLORIDE 25 ML/HR: 600; 310; 30; 20 INJECTION, SOLUTION INTRAVENOUS at 11:46

## 2022-04-05 RX ADMIN — MIDAZOLAM HYDROCHLORIDE 2 MG: 1 INJECTION, SOLUTION INTRAMUSCULAR; INTRAVENOUS at 12:03

## 2022-04-05 NOTE — PERIOP NOTES
Permission received to review discharge instructions and discuss private health information with , Mykel Matias. Patient states that  will be with them for at least 24 hours following today's procedure. Mistral-Air warming blanket applied at this time. Set to appropriate setting that is comfortable to patient. Will continue to monitor.

## 2022-04-05 NOTE — PERIOP NOTES
Kathia Jain  1990  [id number]    Situation:  Verbal report given from: SHERRELL Dodson   Procedure: Procedure(s):  DILATATION AND CURETTAGE WITH SUCTION    Background:    Preoperative diagnosis: Missed  [O02.1]    Postoperative diagnosis: Missed  [O02.1]    :  Dr. Tiffanie Ramey    Assistant(s): Circ-1: Srikanth Kilpatrick RN  Scrub RN-1: Dru De Leon RN    Specimens:   ID Type Source Tests Collected by Time Destination   1 : [employer] Fresh Uterus  Nahun Mckeon MD 2022 1216 Pathology       Assessment:  Intra-procedure medications         Anesthesia gave intra-procedure sedation and medications, see anesthesia flow sheet     Intravenous fluids: LR@ KVO     Vital signs stable       Recommendation:    Permission to share finding with  : yes

## 2022-04-05 NOTE — ANESTHESIA PREPROCEDURE EVALUATION
Relevant Problems   No relevant active problems       Anesthetic History          Comments: dizziness & lightheadedness with SAB     Review of Systems / Medical History  Patient summary reviewed, nursing notes reviewed and pertinent labs reviewed    Pulmonary          Smoker (Marijuana 3x/week)         Neuro/Psych             Comments: ADHD Cardiovascular  Within defined limits                Exercise tolerance: >4 METS     GI/Hepatic/Renal  Within defined limits              Endo/Other  Within defined limits           Other Findings   Comments: Missed Ab at 8 weeks         Physical Exam    Airway  Mallampati: I  TM Distance: 4 - 6 cm  Neck ROM: normal range of motion   Mouth opening: Normal     Cardiovascular    Rhythm: regular  Rate: normal         Dental  No notable dental hx       Pulmonary  Breath sounds clear to auscultation               Abdominal  GI exam deferred       Other Findings            Anesthetic Plan    ASA: 2  Anesthesia type: MAC and general - backup          Induction: Intravenous  Anesthetic plan and risks discussed with: Patient      Tylenol po preop

## 2022-04-05 NOTE — DISCHARGE INSTRUCTIONS
>>>You received Tylenol 1000mg prior to your surgery, you may take Tylenol (or pain medication containing Tylenol or Acetaminophen) in 6 hours at 5:30 pm .<<<    >>>You received Toradol during your surgery. You may not take any form of NSAIDS (non steroidal anti inflammatory drugs) such as Advil, Ibuprofen, Aleve, Motrin until 6:30 pm.<<<   Dilation and Curettage: What to Expect at Home  Your Recovery  Dilation and curettage (D&C) is a procedure to remove tissue from the inside of the uterus. The doctor used a curved tool, called a curette, to gently scrape tissue from your uterus. You are likely to have a backache, or cramps similar to menstrual cramps, and pass small clots of blood from your vagina for the first few days. You may have light vaginal bleeding for several weeks after the procedure. You will probably be able to go back to most of your normal activities in 1 or 2 days. This care sheet gives you a general idea about how long it will take for you to recover. But each person recovers at a different pace. Follow the steps below to get better as quickly as possible. How can you care for yourself at home? Activity    · Rest when you feel tired. Getting enough sleep will help you recover.     · Most women are able to return to work the day after the procedure.     · You may have some light vaginal bleeding. Use sanitary pads until you stop bleeding. Using pads makes it easier to monitor your bleeding. Do not rinse your vagina with fluid (douche).   · Ask your doctor when it is okay for you to have sex. Diet    · You can eat your normal diet. If your stomach is upset, try bland, low-fat foods like plain rice, broiled chicken, toast, and yogurt.     · Drink plenty of fluids (unless your doctor tells you not to). Medicines    · Your doctor will tell you if and when you can restart your medicines.  You will also get instructions about taking any new medicines.     · If you take aspirin or some other blood thinner, ask your doctor if and when to start taking it again. Make sure that you understand exactly what your doctor wants you to do.     · Be safe with medicines. Take pain medicines exactly as directed. ? If the doctor gave you a prescription medicine for pain, take it as prescribed. ? If you are not taking a prescription pain medicine, ask your doctor if you can take an over-the-counter medicine.     · If you think your pain medicine is making you sick to your stomach:  ? Take your medicine after meals (unless your doctor has told you not to). ? Ask your doctor for a different pain medicine.     · If your doctor prescribed antibiotics, take them as directed. Do not stop taking them just because you feel better. You need to take the full course of antibiotics. Follow-up care is a key part of your treatment and safety. Be sure to make and go to all appointments, and call your doctor if you are having problems. It's also a good idea to know your test results and keep a list of the medicines you take. When should you call for help? Call 911 anytime you think you may need emergency care. For example, call if:    · You passed out (lost consciousness).     · You have chest pain, are short of breath, or cough up blood. Call your doctor now or seek immediate medical care if:    · You have bright red vaginal bleeding that soaks one or more pads in an hour, or you have large clots.     · You have vaginal discharge that increases in amount or smells bad.     · You are sick to your stomach or cannot drink fluids.     · You have pain that does not get better after you take pain medicine.     · You cannot pass stools or gas.     · You have symptoms of a blood clot in your leg (called a deep vein thrombosis), such as:  ? Pain in your calf, back of the knee, thigh, or groin. ? Redness and swelling in your leg.     · You have signs of infection, such as:  ? Increased pain, swelling, warmth, or redness. ?  Red streaks leading from the area. ? Pus draining from the area. ? A fever. Watch closely for changes in your health, and be sure to contact your doctor if you have any problems. Where can you learn more? Go to http://www.gray.com/  Enter D453 in the search box to learn more about \"Dilation and Curettage: What to Expect at Home. \"  Current as of: June 16, 2021               Content Version: 13.2  © 2006-2022 [employer]. Care instructions adapted under license by [employer] (which disclaims liability or warranty for this information). If you have questions about a medical condition or this instruction, always ask your healthcare professional. [place] [address] any warranty or liability for your use of this information. TAKE TYLENOL/ACETAMINOPHEN 500 mg every 6 hours in addition to [place] 74     Narcotics tend to be constipating, we suggest taking a stool softener such as Colace or Miralax (follow package instructions). DO NOT DRIVE WHILE TAKING NARCOTIC PAIN MEDICATIONS. DO NOT TAKE SLEEPING MEDICATIONS OR ANTIANXIETY MEDICATIONS WHILE TAKING NARCOTIC PAIN MEDICATIONS,  ESPECIALLY THE NIGHT OF ANESTHESIA! CPAP PATIENTS BE SURE TO WEAR MACHINE WHENEVER NAPPING OR SLEEPING! DISCHARGE SUMMARY from Nurse    The following personal items collected during your admission are returned to you:   Dental Appliance: Dental Appliances: None  Vision: Visual Aid: None  Hearing Aid:    Jewelry: Jewelry: None  Clothing: Clothing: With patient  Other Valuables: Other Valuables: Purse (Purse and jewelry given to )  Valuables sent to safe:        PATIENT INSTRUCTIONS:    After General Anesthesia or Intravenous Sedation, for 24 hours or while taking prescription Narcotics:        Someone should be with you for the next 24 hours. For your own safety, a responsible adult must drive you home.   · Limit your activities  · Recommended activity: Rest today, up with assistance today. Do not climb stairs or shower unattended for the next 24 hours. · Please start with a soft bland diet and advance as tolerated (no nausea) to regular diet. · If you have a sore throat you should try the following: fluids, warm salt water gargles, or throat lozenges. If it does not improve after several days please follow up with your primary physician. · Do not drive and operate hazardous machinery  · Do not make important personal or business decisions  · Do  not drink alcoholic beverages  · If you have not urinated within 8 hours after discharge, please contact your surgeon on call. Report the following to your surgeon:  · Excessive pain, swelling, redness or odor of or around the surgical area  · Temperature over 100.5  · Nausea and vomiting lasting longer than 4 hours or if unable to take medications  · Any signs of decreased circulation or nerve impairment to extremity: change in color, persistent  numbness, tingling, coldness or increase pain      · You will receive a Post Operative Call from one of the Recovery Room Nurses on the day after your surgery to check on you. It is very important for us to know how you are recovering after your surgery. If you have an issue or need to speak with someone, please call your surgeon, do not wait for the post operative call. · You may receive an e-mail or letter in the mail from [employer] regarding your experience with us in the Ambulatory Surgery Unit. Your feedback is valuable to us and we appreciate your participation in the survey. · If the above instructions are not adequate or you are having problems after your surgery, call the physician at their office number. · We wish you a speedy recovery ? What to do at Home:      *  Please give a list of your current medications to your Primary Care Provider.     *  Please update this list whenever your medications are discontinued, doses are      changed, or new medications (including over-the-counter products) are added. *  Please carry medication information at all times in case of emergency situations. If you have not received your influenza and/or pneumococcal vaccine, please follow up with your primary care physician. The discharge information has been reviewed with the patient and caregiver. The patient and caregiver verbalized understanding.

## 2022-04-05 NOTE — H&P
Gynecology History and Physical    Name: Ai Bethea MRN: [id number] SSN: [id number]    YOB: 1990  Age: 27 y.o. Sex: female       Subjective:      Chief complaint:  Missed     Merlin Ensign is a 27 y.o.  female with a history of missed ab at 8 weeks. Previous workup included Ultrasound which revealed a missed ab. Previous treatment measures included none. She is admitted for Procedure(s) (LRB):  DILATATION AND CURETTAGE WITH SUCTION (N/A). The current method of family planning is none.     OB History        3    Para   2    Term   2       0    AB   0    Living   2       SAB   0    IAB   0    Ectopic   0    Molar   0    Multiple   0    Live Births   2              Past Medical History:   Diagnosis Date    Abnormal Papanicolaou smear of cervix      repeat pap cleared    ADHD 2009    was on Adderall    Nausea & vomiting      Past Surgical History:   Procedure Laterality Date    HX  SECTION  2014    HX COLPOSCOPY  2018    normal    HX WISDOM TEETH EXTRACTION       Social History     Occupational History    Occupation: OT    Tobacco Use    Smoking status: Never Smoker    Smokeless tobacco: Never Used   Vaping Use    Vaping Use: Never used   Substance and Sexual Activity    Alcohol use: Not Currently     Alcohol/week: 2.0 standard drinks     Types: 2 Glasses of wine per week    Drug use: Yes     Frequency: 3.0 times per week     Types: Marijuana     Comment: not used since 8 weeks ago per pt on 2022    Sexual activity: Yes     Partners: Male     Birth control/protection: Condom     Family History   Problem Relation Age of Onset    Hypertension Mother     High Cholesterol Mother     Hypertension Father     High Cholesterol Father     Prostate Cancer Father     No Known Problems Brother     Dementia Maternal Grandmother     Hypertension Maternal Grandmother     Hypertension Paternal Grandmother     Hypertension Paternal Grandfather  Diabetes Paternal Uncle     Schizophrenia Paternal Uncle     Diabetes Paternal Aunt         Allergies   Allergen Reactions    Bactrim [Sulfamethoprim Ds] Hives    Bactrim [Sulfamethoprim] Hives     Prior to Admission medications    Not on File        Review of Systems:  A comprehensive review of systems was negative except for that written in the History of Present Illness. Objective:     Vitals:    22 1642   Weight: 64 kg (141 lb)   Height: 5' 1\" (1.549 m)       Physical Exam:  Deferred    Assessment:     Active Problems:    * No active hospital problems. *     Missed  with wants d+c    Plan:     Procedure(s) (LRB):  DILATATION AND CURETTAGE WITH SUCTION (N/A)  Discussed the risks of surgery including the risks of bleeding, infection, deep vein thrombosis, and surgical injuries to internal organs including but not limited to the bowels, bladder, rectum, and female reproductive organs. The patient understands the risks; any and all questions were answered to the patient's satisfaction.

## 2022-04-05 NOTE — ANESTHESIA POSTPROCEDURE EVALUATION
Procedure(s):  DILATATION AND CURETTAGE WITH SUCTION. MAC, general - backup    Anesthesia Post Evaluation      Multimodal analgesia: multimodal analgesia used between 6 hours prior to anesthesia start to PACU discharge  Patient location during evaluation: PACU  Patient participation: complete - patient participated  Level of consciousness: awake and alert  Pain score: 0  Airway patency: patent  Anesthetic complications: no  Cardiovascular status: acceptable  Respiratory status: acceptable  Hydration status: acceptable  Post anesthesia nausea and vomiting:  none  Final Post Anesthesia Temperature Assessment:  Normothermia (36.0-37.5 degrees C)      INITIAL Post-op Vital signs:   Vitals Value Taken Time   /66 04/05/22 1255   Temp 37 °C (98.6 °F) 04/05/22 1238   Pulse 60 04/05/22 1258   Resp 16 04/05/22 1258   SpO2 97 % 04/05/22 1258   Vitals shown include unvalidated device data.

## 2022-04-05 NOTE — PERIOP NOTES
Patient: Gayla Welch MRN: [id number]  SSN: [id number]   YOB: 1990  Age: 27 y.o. Sex: female     Patient is status post Procedure(s):  DILATATION AND CURETTAGE WITH SUCTION. Surgeon(s) and Role:     * Joseph Mccormick MD - Primary    Local/Dose/Irrigation:  SEE MAR                  Peripheral IV 04/05/22 Anterior;Proximal;Right Forearm (Active)   Site Assessment Clean, dry, & intact 04/05/22 1143   Phlebitis Assessment 0 04/05/22 1143   Dressing Status Clean, dry, & intact 04/05/22 1143   Dressing Type Transparent 04/05/22 1143   Hub Color/Line Status Pink; Infusing 04/05/22 1143                           Dressing/Packing:  Incision 04/05/22 Vagina-Dressing/Treatment: Peripad (04/05/22 1100)      SPECIMEN FOR GENETIC TESTING. CRNA OBTAINED BLOOD SPECIMEN IN PURPLE TOP TUBE. KIT TAKEN TO LAB BY NETTIE INTERIANO. PER LEEANNE SOLIS RN-LAB STATES TO ONLY OBTAIN BLOOD SAMPLE AND BRING IAN KIT/BOX TO LAB WITH THE SPECIMEN.

## 2022-04-06 NOTE — OP NOTES
[facility]  OPERATIVE REPORT    Name:  Dada Flynn  MR#:  [id number]  :  1990  ACCOUNT #:  [de-identified]  DATE OF SERVICE:  2022    PREOPERATIVE DIAGNOSIS:  Missed  at 8 weeks. POSTOPERATIVE DIAGNOSIS:  Missed  at 8 weeks. PROCEDURE PERFORMED:  Suction D and C completion. SURGEON:  Eric Hurst MD    ASSISTANT:  None. ANESTHESIA:  LMA and IV sedation as well as paracervical block. COMPLICATIONS:  None. SPECIMENS REMOVED:  Products of conception. IMPLANTS:  None. ESTIMATED BLOOD LOSS:  30 mL. FINDINGS:  With suction D and C, there was an appropriate amount of products of conception. DESCRIPTION OF PROCEDURE:  The patient was taken to the operating room, placed on the operating room table. After adequate anesthesia was obtained, she was placed in the dorsal lithotomy position. The perineum and vagina were prepped and the patient was draped in the usual sterile fashion. A sterile speculum was inserted. A single-tooth tenaculum was placed in the anterior lip of the cervix and a paracervical block was performed using lidocaine. The cervix was dilated, a #8 suction curette was easily introduced. The appropriate suction was used and the entire uterine contents were evacuated. A smooth curette was passed and there was minimal return. The suction curette was passed once more and there was minimal return. All instruments were removed from the vagina and there was good hemostasis from the vagina. All sponge, needle and instrument counts were correct x2 at the end of the procedure. The patient tolerated the procedure well, was taken to the postanesthesia care unit in satisfactory condition.       Joann Spain MD      [id number]  D:  2022 15:52  T:  2022 21:51  JOB #:  [id number]

## 2022-07-25 ENCOUNTER — PATIENT MESSAGE (OUTPATIENT)
Dept: FAMILY MEDICINE CLINIC | Age: 32
End: 2022-07-25

## (undated) DEVICE — BLADE ASSEMB CLP HAIR FINE --

## (undated) DEVICE — SOL IRRIGATION INJ NACL 0.9% 500ML BTL

## (undated) DEVICE — GLOVE ORANGE PI 7 1/2   MSG9075

## (undated) DEVICE — SUTURE PLN GUT SZ 2-0 L27IN ABSRB YELLOWISH TAN L70MM XLH 53T

## (undated) DEVICE — STRIP,CLOSURE,WOUND,MEDI-STRIP,1/2X4: Brand: MEDLINE

## (undated) DEVICE — C-SECTION II-LF: Brand: MEDLINE INDUSTRIES, INC.

## (undated) DEVICE — SUTURE VCRL SZ 0 L36IN ABSRB VLT L40MM CT 1/2 CIR J358H

## (undated) DEVICE — SOLIDIFIER FLUID 3000 CC ABSORB

## (undated) DEVICE — HANDLE LT SNAP ON ULT DURABLE LENS FOR TRUMPF ALC DISPOSABLE

## (undated) DEVICE — GOWN,AURORA,FABRIC-REINFORCED,X-LARGE: Brand: MEDLINE

## (undated) DEVICE — D&C MRMC: Brand: MEDLINE INDUSTRIES, INC.

## (undated) DEVICE — STERILE POLYISOPRENE POWDER-FREE SURGICAL GLOVES: Brand: PROTEXIS

## (undated) DEVICE — SOLUTION IV 1000ML 0.9% SOD CHL

## (undated) DEVICE — COLLECTION KT SUC TISS BERK -- GYRUS

## (undated) DEVICE — REM POLYHESIVE ADULT PATIENT RETURN ELECTRODE: Brand: VALLEYLAB

## (undated) DEVICE — PICO 7 10CM X 30CM: Brand: PICO™ 7

## (undated) DEVICE — TOWEL,OR,DSP,ST,BLUE,STD,2/PK,40PK/CS: Brand: MEDLINE

## (undated) DEVICE — MASTISOL ADHESIVE LIQ 2/3ML

## (undated) DEVICE — GOWN,SIRUS,FABRNF,XL,20/CS: Brand: MEDLINE

## (undated) DEVICE — STAPLER SKIN SQ 30 ABSRB STPL DISP INSORB

## (undated) DEVICE — Device

## (undated) DEVICE — TIP CLEANER: Brand: VALLEYLAB

## (undated) DEVICE — (D)PREP SKN CHLRAPRP APPL 26ML -- CONVERT TO ITEM 371833

## (undated) DEVICE — VAGINAL PREP TRAY: Brand: MEDLINE INDUSTRIES, INC.

## (undated) DEVICE — 3000CC GUARDIAN II: Brand: GUARDIAN

## (undated) DEVICE — GARMENT,MEDLINE,DVT,INT,CALF,MED, GEN2: Brand: MEDLINE

## (undated) DEVICE — TRAY,URINE METER,100% SILICONE,16FR10ML: Brand: MEDLINE

## (undated) DEVICE — TUBING SUCT L6FT ID0.5IN CLR PLAS M CONN

## (undated) DEVICE — COVER LT HNDL PLAS RIG 1 PER PK

## (undated) DEVICE — SYRINGE IRRIG 60ML SFT PLIABLE BLB EZ TO GRP 1 HND USE W/

## (undated) DEVICE — SUTURE ABSRB BRAID COAT UD CT NO 1 36IN VCRL J959H